# Patient Record
Sex: FEMALE | Race: OTHER | HISPANIC OR LATINO | Employment: STUDENT | URBAN - METROPOLITAN AREA
[De-identification: names, ages, dates, MRNs, and addresses within clinical notes are randomized per-mention and may not be internally consistent; named-entity substitution may affect disease eponyms.]

---

## 2017-10-11 ENCOUNTER — APPOINTMENT (OUTPATIENT)
Dept: URGENT CARE | Facility: MEDICAL CENTER | Age: 22
End: 2017-10-11
Payer: OTHER MISCELLANEOUS

## 2017-10-11 PROCEDURE — 99283 EMERGENCY DEPT VISIT LOW MDM: CPT

## 2017-10-11 PROCEDURE — G0382 LEV 3 HOSP TYPE B ED VISIT: HCPCS

## 2017-10-11 PROCEDURE — 12001 RPR S/N/AX/GEN/TRNK 2.5CM/<: CPT

## 2017-10-16 ENCOUNTER — APPOINTMENT (OUTPATIENT)
Dept: URGENT CARE | Facility: MEDICAL CENTER | Age: 22
End: 2017-10-16
Payer: OTHER MISCELLANEOUS

## 2017-10-16 PROCEDURE — 99213 OFFICE O/P EST LOW 20 MIN: CPT

## 2017-10-25 ENCOUNTER — APPOINTMENT (OUTPATIENT)
Dept: URGENT CARE | Facility: MEDICAL CENTER | Age: 22
End: 2017-10-25
Payer: OTHER MISCELLANEOUS

## 2017-10-25 PROCEDURE — 99213 OFFICE O/P EST LOW 20 MIN: CPT

## 2019-03-04 ENCOUNTER — APPOINTMENT (OUTPATIENT)
Dept: RADIOLOGY | Facility: MEDICAL CENTER | Age: 24
End: 2019-03-04
Payer: COMMERCIAL

## 2019-03-04 ENCOUNTER — OFFICE VISIT (OUTPATIENT)
Dept: URGENT CARE | Facility: MEDICAL CENTER | Age: 24
End: 2019-03-04
Payer: COMMERCIAL

## 2019-03-04 VITALS
HEART RATE: 81 BPM | HEIGHT: 64 IN | DIASTOLIC BLOOD PRESSURE: 73 MMHG | BODY MASS INDEX: 22.53 KG/M2 | RESPIRATION RATE: 16 BRPM | OXYGEN SATURATION: 100 % | WEIGHT: 132 LBS | TEMPERATURE: 97.6 F | SYSTOLIC BLOOD PRESSURE: 115 MMHG

## 2019-03-04 DIAGNOSIS — S93.402A SPRAIN OF LEFT ANKLE, UNSPECIFIED LIGAMENT, INITIAL ENCOUNTER: ICD-10-CM

## 2019-03-04 DIAGNOSIS — M25.572 ACUTE LEFT ANKLE PAIN: Primary | ICD-10-CM

## 2019-03-04 DIAGNOSIS — M25.572 ACUTE LEFT ANKLE PAIN: ICD-10-CM

## 2019-03-04 PROCEDURE — 73610 X-RAY EXAM OF ANKLE: CPT

## 2019-03-04 PROCEDURE — 99213 OFFICE O/P EST LOW 20 MIN: CPT | Performed by: FAMILY MEDICINE

## 2019-03-05 NOTE — PROGRESS NOTES
[facility]        NAME: Marylen Cheek is a 26 y o  female  : 1995    MRN: [id number]  DATE: 2019  TIME: 10:20 PM    Assessment and Plan   Acute left ankle pain [M25 572]  1  Acute left ankle pain  XR ankle 3+ vw left   2  Sprain of left ankle, unspecified ligament, initial encounter           Patient Instructions       Follow up with PCP in 3-5 days  Proceed to  ER if symptoms worsen  Chief Complaint     Chief Complaint   Patient presents with    Ankle Pain     Patient here with left ankle pain after a fall 1 week ago  History of Present Illness       Patient complaining of left ankle pain for the past week  Approximately 1 week ago she was going down 2 stairs when she twisted her ankle  Pain is predominantly on the top and outer aspect  Denies any swelling or bruising  She has been weight-bearing  However pain is gotten progressively worse  She has been taking some over-the-counter ibuprofen with minimal improvement  Denies previous left ankle injury in the past       Review of Systems   Review of Systems   Musculoskeletal: Positive for arthralgias  Current Medications     No current outpatient medications on file  Current Allergies     Allergies as of 2019    (No Known Allergies)            The following portions of the patient's history were reviewed and updated as appropriate: allergies, current medications, past family history, past medical history, past social history, past surgical history and problem list      No past medical history on file  No past surgical history on file  No family history on file  Medications have been verified          Objective   /73 (BP Location: Left arm, Patient Position: Sitting, Cuff Size: Standard)   Pulse 81   Temp 97 6 °F (36 4 °C) (Tympanic)   Resp 16   Ht 5' 4" (1 626 m)   Wt 59 9 kg (132 lb)   SpO2 100%   BMI 22 66 kg/m²        Physical Exam     Physical Exam   Musculoskeletal: She exhibits tenderness  Left lower extremity:  Full range on plantar flexion dorsiflexion  There is pain on inversion  No effusion appreciated over the lateral malleolus  No ecchymosis appreciated  Good tactile sensation capillary refill distal to the injury

## 2019-03-05 NOTE — PATIENT INSTRUCTIONS
X-ray of left ankle reveals no fracture subluxation  I wrap the patient's left foot with Ace wrap  Advised her to immersed her left foot in warm water for 15-20 minutes 2 to 3 times a day, keep left foot elevated  Continue with ibuprofen  Follow up with PCP symptoms persist or worsen  Esguince de tobillo   LO QUE NECESITA SABER:   Un esguince o torcedura de tobillo ocurre cuando 1 o más ligamentos en la articulación del tobillo se estiran o desgarran  Los ligamentos son tejidos resistentes que [employer]  Los ligamentos sostienen las articulaciones y [employer] huesos en govea lugar  INSTRUCCIONES SOBRE EL PREM HOSPITALARIA:   Regrese a la nathen de emergencias si:   · Usted tiene dolor intenso en el tobillo  · Govea pie o dedos del pie están fríos o entumecidos  · Govea tobillo se pone mas débil o inestable (tambaleante)  · Usted no puede apoyarse sobre el tobillo o pie  · Govea inflamación ha aumentado o regresado  Pregúntele a govea Glory Rinne vitaminas y minerales son adecuados para usted  · El dolor no desaparece, incluso después del [place]  · Usted tiene preguntas o inquietudes acerca de govea condición o cuidado  Medicamentos:  Es posible que usted necesite alguno de los siguientes:  · AINEs (Analgésicos antiinflamatorios no esteroides) jostin el ibuprofeno, ayudan a disminuir la inflamación, el dolor y la fiebre  Radha medicamento esta disponible con o sin serena receta médica  Los AINEs pueden causar sangrado estomacal o problemas renales en ciertas personas  Si usted franklyn un medicamento anticoagulante, siempre pregúntele a govea médico si los CASSIDY son seguros para usted  Siempre cornell la etiqueta de radha medicamento y [place] instrucciones  · El acetaminofén  [employer]  Está disponible sin receta médica  Pregunte la cantidad y la frecuencia con que debe tomarlos  [id number]  El acetaminofén puede causar daño en el hígado cuando no se franklyn de forma correcta      · Un medicamento con receta para el dolor  podrían ser Lendell Pitch  Pregunte cómo robby estos medicamentos de serena forma hansen  · Butte paris medicamentos jostin se le haya indicado  Consulte con govea médico si usted osmany que govea medicamento no le está ayudando o si presenta efectos secundarios  Infórmele si es alérgico a cualquier medicamento  Mantenga serena lista actualizada de los [employer], las vitaminas y los productos herbales que franklyn  Incluya los siguientes datos de los medicamentos: cantidad, frecuencia y motivo de administración  Traiga con usted la lista o los envases de la píldoras a paris citas de seguimiento  Lleve la lista de los medicamentos con usted en marge de serena emergencia  Cuidado personal:   · Use aparatos ortopédicos,  jostin serena [place], un yeso o serena férula podrían necesitarse para inmovilizar y proteger la articulación  Es posible que necesite usar muletas para disminuir el dolor cuando se desplace de un lado a otro  · Vaya a terapia física según indicaciones  Un fisioterapeuta le puede enseñar ejercicios para ayudarle a mejorar el movimiento y la fuerza, y para disminuir el dolor  · Descanse  govea tobillo para que pueda sanar  Regrese a paris actividades cotidianas según las indicaciones  · Aplique hielo sobre el tobillo de 15 a 20 minutos cada hora o jostin se le indique  Use un paquete de hielo o ponga hielo molido dentro de [employer]  Cúbrala con serena toalla  El hielo ayuda a evitar daño al tejido y a disminuir la inflamación y el dolor  · Compresión  govea tobillo  Pregunte si debería envolver un vendaje elástico alrededor del ligamento lesionado  La venda elástica le proporciona soporte y ayuda a disminuir la inflamación y el movimiento para que la articulación se recupere  Úselo por el tiempo indicado  · Eleve  govea tobillo por encima del nivel de govea corazón con la mayor frecuencia posible  [place] va a disminuir inflamación y el dolor   Apoye el tobillo sobre almohadas o cobijas para mantenerlo elevado cómodamente  Evite otra torcedura de tobillo:   · Permita que el tobillo se recupere  Infórmese del tiempo que el ligamento necesita par sanar  No tavon ninguna actividad física hasta que el médico lo autorice  Si usted [employer], podría desarrollar serena lesión mas grave  · Siempre tavon ejercicios de calentamiento y estiramiento  antes de hacer ejercicio o practicar un deporte  · Use equipo adecuado  Siempre use el calzado que le quede logan y esté hecho para la actividad que está practicando  También podría necesitar apoyo para los tobillos, almohadillas para los codos y rodillas o abrazaderas  Acuda a paris consultas de control con eddy médico según le indicaron  Anote paris preguntas para que se acuerde de hacerlas hellen paris visitas  © 2017 [facility] Information is for End User's use only and may not be sold, redistributed or otherwise used for commercial purposes  All illustrations and images included in CareNotes® are the copyrighted property of [employer] , Inc  or Parth Urbano  Esta información es sólo para uso en educación  Eddy intención no es darle un consejo médico sobre enfermedades o tratamientos  Colsulte con eddy Laruth Elliott farmacéutico antes de seguir cualquier régimen médico para saber si es seguro y efectivo para usted

## 2019-03-11 ENCOUNTER — OFFICE VISIT (OUTPATIENT)
Dept: FAMILY MEDICINE CLINIC | Facility: CLINIC | Age: 24
End: 2019-03-11
Payer: COMMERCIAL

## 2019-03-11 VITALS
OXYGEN SATURATION: 99 % | DIASTOLIC BLOOD PRESSURE: 70 MMHG | WEIGHT: 133 LBS | TEMPERATURE: 97.6 F | HEIGHT: 64 IN | BODY MASS INDEX: 22.71 KG/M2 | HEART RATE: 83 BPM | SYSTOLIC BLOOD PRESSURE: 110 MMHG | RESPIRATION RATE: 16 BRPM

## 2019-03-11 DIAGNOSIS — Z23 ENCOUNTER FOR VACCINATION: Primary | ICD-10-CM

## 2019-03-11 PROCEDURE — 3008F BODY MASS INDEX DOCD: CPT | Performed by: SPECIALIST

## 2019-03-11 PROCEDURE — 1036F TOBACCO NON-USER: CPT | Performed by: SPECIALIST

## 2019-03-11 PROCEDURE — 99213 OFFICE O/P EST LOW 20 MIN: CPT | Performed by: SPECIALIST

## 2019-03-11 NOTE — PROGRESS NOTES
Assessment/Plan:    Plan  Observe   For   2  Weeks   No point  Tenderness     Not  Red     Not   Swollen            Diagnoses and all orders for this visit:    Encounter for vaccination  -     Cancel: TDAP VACCINE GREATER THAN OR EQUAL TO 8YO IM  -     Cancel: influenza vaccine, [id number], quadrivalent, recombinant, PF, 0 5 mL, for patients 18 yr+ (FLUBLOK)          Subjective:     BMI Counseling: Body mass index is 22 83 kg/m²  Discussed the patient's BMI with her  The BMI is above average  BMI counseling and education was provided to the patient  Nutrition recommendations include decreasing overall calorie intake  Exercise recommendations include moderate aerobic physical activity for 150 minutes/week  Patient ID: Grupo Waterman is a 26 y o  female  24 Yo  Slipped  2   Weeks  Ago  On  Snow  Covered  Steps     Seen  walkin   No  Fracture      Taking  Motrin  Prn    Getting  Better     At  Worse   6  /  10   But   Improved         The following portions of the patient's history were reviewed and updated as appropriate: allergies, current medications, past family history, past medical history, past social history, past surgical history and problem list     Review of Systems   Constitutional: Negative for activity change, appetite change, chills, diaphoresis, fatigue and fever  HENT: Negative for congestion, sinus pressure, sinus pain and voice change  Eyes: Negative for visual disturbance  Respiratory: Negative for cough, chest tightness, shortness of breath and wheezing  Cardiovascular: Negative for chest pain, palpitations and leg swelling  Gastrointestinal: Negative for abdominal distention  Musculoskeletal: Negative for arthralgias, back pain, gait problem, joint swelling, myalgias, neck pain and neck stiffness  Skin: Negative            Objective:      /70 (BP Location: Left arm, Patient Position: Sitting, Cuff Size: Standard)   Pulse 83   Temp 97 6 °F (36 4 °C) (Tympanic) Resp 16   Ht 5' 4" (1 626 m)   Wt 60 3 kg (133 lb)   SpO2 99%   BMI 22 83 kg/m²          Physical Exam   Musculoskeletal: Normal range of motion  She exhibits no edema, tenderness or deformity     Left  Ankle   No  Swelling   No point  Tenderness     No  Redness       No  Pain  Flexion   Or  Everting

## 2019-03-11 NOTE — PATIENT INSTRUCTIONS
continue   To monitor  If  Worse   Need  To  See  Ortho    Stay  Off  Feet   As  Much  As  Possible       Ok  motrin

## 2020-07-07 ENCOUNTER — TELEPHONE (OUTPATIENT)
Dept: FAMILY MEDICINE CLINIC | Facility: CLINIC | Age: 25
End: 2020-07-07

## 2020-07-07 NOTE — TELEPHONE ENCOUNTER
----- Message from Malachy Nyhan, MA sent at 7/7/2020 10:37 AM EDT -----  This pt has been seen with-in the last 3 years, however has not seen with-in the past year  Please call pt to make well / follow-up visit  After 3 attempts send back to junior to send letter

## 2021-08-04 ENCOUNTER — OFFICE VISIT (OUTPATIENT)
Dept: URGENT CARE | Facility: MEDICAL CENTER | Age: 26
End: 2021-08-04
Payer: COMMERCIAL

## 2021-08-04 VITALS
OXYGEN SATURATION: 99 % | SYSTOLIC BLOOD PRESSURE: 138 MMHG | DIASTOLIC BLOOD PRESSURE: 91 MMHG | HEART RATE: 93 BPM | TEMPERATURE: 97.8 F | RESPIRATION RATE: 18 BRPM

## 2021-08-04 DIAGNOSIS — R19.7 DIARRHEA, UNSPECIFIED TYPE: ICD-10-CM

## 2021-08-04 DIAGNOSIS — Z20.822 ENCOUNTER FOR LABORATORY TESTING FOR COVID-19 VIRUS: ICD-10-CM

## 2021-08-04 DIAGNOSIS — R10.9 ABDOMINAL PAIN, UNSPECIFIED ABDOMINAL LOCATION: Primary | ICD-10-CM

## 2021-08-04 DIAGNOSIS — N39.0 URINARY TRACT INFECTION WITHOUT HEMATURIA, SITE UNSPECIFIED: ICD-10-CM

## 2021-08-04 LAB
SL AMB  POCT GLUCOSE, UA: NORMAL
SL AMB LEUKOCYTE ESTERASE,UA: NORMAL
SL AMB POCT BILIRUBIN,UA: NORMAL
SL AMB POCT BLOOD,UA: NORMAL
SL AMB POCT CLARITY,UA: CLEAR
SL AMB POCT COLOR,UA: YELLOW
SL AMB POCT KETONES,UA: NORMAL
SL AMB POCT NITRITE,UA: NORMAL
SL AMB POCT PH,UA: 1.01
SL AMB POCT SPECIFIC GRAVITY,UA: 1.01
SL AMB POCT URINE HCG: NORMAL
SL AMB POCT URINE PROTEIN: NORMAL
SL AMB POCT UROBILINOGEN: NORMAL

## 2021-08-04 PROCEDURE — 87086 URINE CULTURE/COLONY COUNT: CPT | Performed by: PHYSICIAN ASSISTANT

## 2021-08-04 PROCEDURE — 81025 URINE PREGNANCY TEST: CPT | Performed by: PHYSICIAN ASSISTANT

## 2021-08-04 PROCEDURE — U0005 INFEC AGEN DETEC AMPLI PROBE: HCPCS | Performed by: PHYSICIAN ASSISTANT

## 2021-08-04 PROCEDURE — 99213 OFFICE O/P EST LOW 20 MIN: CPT | Performed by: PHYSICIAN ASSISTANT

## 2021-08-04 PROCEDURE — U0003 INFECTIOUS AGENT DETECTION BY NUCLEIC ACID (DNA OR RNA); SEVERE ACUTE RESPIRATORY SYNDROME CORONAVIRUS 2 (SARS-COV-2) (CORONAVIRUS DISEASE [COVID-19]), AMPLIFIED PROBE TECHNIQUE, MAKING USE OF HIGH THROUGHPUT TECHNOLOGIES AS DESCRIBED BY CMS-2020-01-R: HCPCS | Performed by: PHYSICIAN ASSISTANT

## 2021-08-04 RX ORDER — CEPHALEXIN 500 MG/1
500 CAPSULE ORAL EVERY 8 HOURS SCHEDULED
Qty: 30 CAPSULE | Refills: 0 | Status: SHIPPED | OUTPATIENT
Start: 2021-08-04 | End: 2021-08-14

## 2021-08-04 RX ORDER — DESOGESTREL AND ETHINYL ESTRADIOL 0.15-0.03
1 KIT ORAL DAILY
COMMUNITY
Start: 2021-06-23 | End: 2022-06-23

## 2021-08-04 RX ORDER — ETONOGESTREL 68 MG/1
1 IMPLANT SUBCUTANEOUS
COMMUNITY

## 2021-08-04 NOTE — PROGRESS NOTES
[facility]        NAME: Jennifer Oliveira is a 26 y o  female  : 1995    MRN: [id number]  DATE: 2021  TIME: 12:24 PM    /91   Pulse 93   Temp 97 8 °F (36 6 °C)   Resp 18   SpO2 99%     Assessment and Plan   Abdominal pain, unspecified abdominal location [R10 9]  1  Abdominal pain, unspecified abdominal location  POCT urine dip auto non-scope    Urine culture    POCT urine HCG    cephalexin (KEFLEX) 500 mg capsule   2  Diarrhea, unspecified type  Novel Coronavirus (Covid-19),PCR [facility] - Office Collection    cephalexin (KEFLEX) 500 mg capsule   3  Urinary tract infection without hematuria, site unspecified  cephalexin (KEFLEX) 500 mg capsule   4  Encounter for laboratory testing for COVID-19 virus  cephalexin (KEFLEX) 500 mg capsule         Patient Instructions       Follow up with PCP in 3-5 days  Proceed to  ER if symptoms worsen  Chief Complaint     Chief Complaint   Patient presents with    Possible UTI     Patient c/o dysuria, Abdominal pain with cramping, diarrhea, and lower back pain x 1 week          History of Present Illness       Pt with 1 week of abdomen cramping with burning with urination, pt also with diarrhea,  Pt is not covid vaccinated       Review of Systems   Review of Systems   Constitutional: Negative  HENT: Negative  Eyes: Negative  Respiratory: Negative  Cardiovascular: Negative  Gastrointestinal: Negative  Endocrine: Negative  Genitourinary: Negative  Musculoskeletal: Negative  Skin: Negative  Allergic/Immunologic: Negative  Neurological: Negative  Hematological: Negative  Psychiatric/Behavioral: Negative  All other systems reviewed and are negative          Current Medications       Current Outpatient Medications:     desogestrel-ethinyl estradiol (APRI) 0 15-30 MG-MCG per tablet, Take 1 tablet by mouth daily, Disp: , Rfl:     cephalexin (KEFLEX) 500 mg capsule, Take 1 capsule (500 mg total) by mouth every 8 (eight) hours for 10 days, Disp: 30 capsule, Rfl: 0    etonogestrel (Nexplanon) subdermal implant, Inject 1 each under the skin, Disp: , Rfl:     Current Allergies     Allergies as of 08/04/2021    (No Known Allergies)            The following portions of the patient's history were reviewed and updated as appropriate: allergies, current medications, past family history, past medical history, past social history, past surgical history and problem list      History reviewed  No pertinent past medical history  History reviewed  No pertinent surgical history  Family History   Problem Relation Age of Onset    No Known Problems Mother     No Known Problems Father          Medications have been verified  Objective   /91   Pulse 93   Temp 97 8 °F (36 6 °C)   Resp 18   SpO2 99%        Physical Exam     Physical Exam  Vitals and nursing note reviewed  Constitutional:       Appearance: Normal appearance  She is normal weight  Comments: Pt will go to er if condition worsens     HENT:      Head: Normocephalic and atraumatic  Right Ear: Tympanic membrane, ear canal and external ear normal       Left Ear: Tympanic membrane, ear canal and external ear normal       Nose: Nose normal       Mouth/Throat:      Mouth: Mucous membranes are moist       Pharynx: Oropharynx is clear  Eyes:      Extraocular Movements: Extraocular movements intact  Conjunctiva/sclera: Conjunctivae normal       Pupils: Pupils are equal, round, and reactive to light  Cardiovascular:      Rate and Rhythm: Normal rate and regular rhythm  Pulses: Normal pulses  Heart sounds: Normal heart sounds  Pulmonary:      Effort: Pulmonary effort is normal       Breath sounds: Normal breath sounds  Abdominal:      General: Abdomen is flat  Bowel sounds are normal       Palpations: Abdomen is soft  Musculoskeletal:         General: Normal range of motion        Cervical back: Normal range of motion and neck supple  Skin:     General: Skin is warm  Capillary Refill: Capillary refill takes less than 2 seconds  Neurological:      General: No focal deficit present  Mental Status: She is alert and oriented to person, place, and time     Psychiatric:         Mood and Affect: Mood normal          Behavior: Behavior normal

## 2021-08-04 NOTE — PATIENT INSTRUCTIONS
[address]    Your healthcare provider and/or public health staff have evaluated you and have determined that you do not need to remain in the hospital at this time  At this time you can be isolated at home where you will be monitored by staff from your local or state health department  You should carefully follow the prevention and isolation steps below until a healthcare provider or local or state health department says that you can return to your normal activities  Stay home except to get medical care    People who are mildly ill with COVID-19 are able to isolate at home during their illness  You should restrict activities outside your home, except for getting medical care  Do not go to work, school, or public areas  Avoid using public transportation, ride-sharing, or taxis  Separate yourself from other people and animals in your home    People: As much as possible, you should stay in a specific room and away from other people in your home  Also, you should use a separate bathroom, if available  Animals: You should restrict contact with pets and other animals while you are sick with COVID-19, just like you would around other people  Although there have not been reports of pets or other animals becoming sick with COVID-19, it is still recommended that people sick with COVID-19 limit contact with animals until more information is known about the virus  When possible, have another member of your household care for your animals while you are sick  If you are sick with COVID-19, avoid contact with your pet, including petting, snuggling, being kissed or licked, and sharing food  If you must care for your pet or be around animals while you are sick, wash your hands before and after you interact with pets and wear a facemask  See COVID-19 and Animals for more information      Call ahead before visiting your doctor    If you have a medical appointment, call the healthcare provider and tell them that you have or may have COVID-19  This will help the healthcare providers office take steps to keep other people from getting infected or exposed  Wear a facemask    You should wear a facemask when you are around other people (e g , sharing a room or vehicle) or pets and before you enter a healthcare providers office  If you are not able to wear a facemask (for example, because it causes trouble breathing), then people who live with you should not stay in the same room with you, or they should wear a facemask if they enter your room  Cover your coughs and sneezes    Cover your mouth and nose with a tissue when you cough or sneeze  Throw used tissues in a lined trash can  Immediately wash your hands with soap and water for at least 20 seconds or, if soap and water are not available, clean your hands with an alcohol-based hand  that contains at least 60% alcohol  Clean your hands often    Wash your hands often with soap and water for at least 20 seconds, especially after blowing your nose, coughing, or sneezing; going to the bathroom; and before eating or preparing food  If soap and water are not readily available, use an alcohol-based hand  with at least 60% alcohol, covering all surfaces of your hands and rubbing them together until they feel dry  Soap and water are the best option if hands are visibly dirty  Avoid touching your eyes, nose, and mouth with unwashed hands  Avoid sharing personal household items    You should not share dishes, drinking glasses, cups, eating utensils, towels, or bedding with other people or pets in your home  After using these items, they should be washed thoroughly with soap and water  Clean all high-touch surfaces everyday    High touch surfaces include counters, tabletops, doorknobs, bathroom fixtures, toilets, phones, keyboards, tablets, and bedside tables  Also, clean any surfaces that may have blood, stool, or body fluids on them   Use a household cleaning spray or wipe, according to the label instructions  Labels contain instructions for safe and effective use of the cleaning product including precautions you should take when applying the product, such as wearing gloves and making sure you have good ventilation during use of the product  Monitor your symptoms    Seek prompt medical attention if your illness is worsening (e g , difficulty breathing)  Before seeking care, call your healthcare provider and tell them that you have, or are being evaluated for, COVID-19  Put on a facemask before you enter the facility  These steps will help the healthcare providers office to keep other people in the office or waiting room from getting infected or exposed  Ask your healthcare provider to call the local or [facility] health department  Persons who are placed under active monitoring or facilitated self-monitoring should follow instructions provided by their local health department or occupational health professionals, as appropriate  If you have a medical emergency and need to call 911, notify the dispatch personnel that you have, or are being evaluated for COVID-19  If possible, put on a facemask before emergency medical services arrive      Discontinuing home isolation    Patients with confirmed COVID-19 should remain under home isolation precautions until the following conditions are met:   - They have had no fever for at least 24 hours (that is one full day of no fever without the use medicine that reduces fevers)  AND  - other symptoms have improved (for example, when their cough or shortness of breath have improved)  AND  - If had mild or moderate illness, at least 10 days have passed since their symptoms first appeared or if severe illness (needed oxygen) or immunosuppressed, at least 20 days have passed since symptoms first appeared  Patients with confirmed COVID-19 should also notify close contacts (including their workplace) and ask that they self-quarantine  Currently, close contact is defined as being within 6 feet for 15 minutes or more from the period 24 hours starting 48 hours before symptom onset to the time at which the patient went into isolation  Close contacts of patients diagnosed with COVID-19 should be instructed by the patient to self-quarantine for 14 days from the last time of their last contact with the patient  Source: [email or web address]/ Pain   WHAT YOU NEED TO KNOW:   Abdominal pain can be dull, achy, or sharp  You may have pain in one area of your abdomen, or in your entire abdomen  Your pain may be caused by a condition such as constipation, food sensitivity or poisoning, infection, or a blockage  Abdominal pain can also be from a hernia, appendicitis, or an ulcer  Liver, gallbladder, or kidney conditions can also cause abdominal pain  The cause of your abdominal pain may be unknown  DISCHARGE INSTRUCTIONS:   Return to the emergency department if:   · You have new chest pain or shortness of breath  · You have pulsing pain in your upper abdomen or lower back that suddenly becomes constant  · Your pain is in the right lower abdominal area and worsens with movement  · You have a fever over 100 4°F (38°C) or shaking chills  · You are vomiting and cannot keep food or liquids down  · Your pain does not improve or gets worse over the next 8 to 12 hours  · You see blood in your vomit or bowel movements, or they look black and tarry  · Your skin or the whites of your eyes turn yellow  · You are a woman and have a large amount of vaginal bleeding that is not your monthly period  Contact your healthcare provider if:   · You have pain in your lower back  · You are a man and have pain in your testicles  · You have pain when you urinate  · You have questions or concerns about your condition or care      Follow up with your healthcare provider within 24 hours or as directed:  Write down your questions so you remember to ask them during your visits  Medicines:   · Medicines  may be given to calm your stomach and prevent vomiting or to decrease pain  Ask how to take pain medicine safely  · Take your medicine as directed  Contact your healthcare provider if you think your medicine is not helping or if you have side effects  Tell him of her if you are allergic to any medicine  Keep a list of the medicines, vitamins, and herbs you take  Include the amounts, and when and why you take them  Bring the list or the pill bottles to follow-up visits  Carry your medicine list with you in case of an emergency  © Copyright [employer] 2021 Information is for End User's use only and may not be sold, redistributed or otherwise used for commercial purposes  All illustrations and images included in CareNotes® are the copyrighted property of [employer]  or Carter Kimble   The above information is an  only  It is not intended as medical advice for individual conditions or treatments  Talk to your doctor, nurse or pharmacist before following any medical regimen to see if it is safe and effective for you  Urinary Tract Infection in Older Adults   WHAT YOU NEED TO KNOW:   A urinary tract infection (UTI) is caused by bacteria that get inside your urinary tract  Your urinary tract includes your kidneys, ureters, bladder, and urethra  Urine is made in your kidneys, and it flows from the ureters to the bladder  Urine leaves the bladder through the urethra  A UTI is more common in your lower urinary tract, which includes your bladder and urethra  DISCHARGE INSTRUCTIONS:   Return to the emergency department if:   · You are urinating very little or not at all  · You are vomiting  · You have a high fever with shaking chills  · You have side or back pain that gets worse  Call your doctor if:   · You have a fever      · You are a woman and you have increased white or yellow discharge from your vagina  · You do not feel better after 2 days of taking antibiotics  · You have questions or concerns about your condition or care  Medicines:   · Medicines  help treat the bacterial infection or decrease pain and burning when you urinate  You may also need medicines to decrease the urge to urinate often  If you have UTIs often (called recurrent UTIs), you may be given antibiotics to take regularly  You will be given directions for when and how to use antibiotics  The goal is to prevent UTIs but not cause antibiotic resistance by using antibiotics too often  · Take your medicine as directed  Contact your healthcare provider if you think your medicine is not helping or if you have side effects  Tell him or her if you are allergic to any medicine  Keep a list of the medicines, vitamins, and herbs you take  Include the amounts, and when and why you take them  Bring the list or the pill bottles to follow-up visits  Carry your medicine list with you in case of an emergency  Self-care:   · Drink liquids as directed  Liquids can help flush bacteria from your urinary tract  Ask how much liquid to drink each day and which liquids are best for you  You may need to drink more liquids than usual to help flush out the bacteria  Do not drink alcohol, caffeine, and citrus juices  These can irritate your bladder and increase your symptoms  · Apply heat  on your abdomen for 20 to 30 minutes every 2 hours for as many days as directed  Heat helps decrease discomfort and pressure in your bladder  Prevent a UTI:   · Urinate when you feel the urge  Do not hold your urine  Bacteria can grow if urine stays in the bladder too long  It may be helpful to urinate at least every 3 to 4 hours  · Urinate after you have sex  to flush away bacteria that can enter your urinary tract during sex  · Wear cotton underwear and clothes that are loose    Tight pants and nylon underwear can trap moisture and cause bacteria to grow  · Cranberry juice or cranberry supplements  may help prevent UTIs  Your healthcare provider can recommend the right juice or supplement for you  · Women should wipe front to back  after urinating or having a bowel movement  This may prevent germs from getting into the urinary tract  Do not douche or use feminine deodorants  These can change the chemical balance in your vagina  You may also be given vaginal estrogen medicine  This medicine helps prevent recurrent UTIs in women who have gone through menopause or are in brandy-menopause  Follow up with your healthcare provider as directed:  Write down your questions so you remember to ask them during your visits  © Copyright [employer] 2021 Information is for End User's use only and may not be sold, redistributed or otherwise used for commercial purposes  All illustrations and images included in CareNotes® are the copyrighted property of [employer]  or Carter Kimble   The above information is an  only  It is not intended as medical advice for individual conditions or treatments  Talk to your doctor, nurse or pharmacist before following any medical regimen to see if it is safe and effective for you

## 2021-08-05 LAB
BACTERIA UR CULT: NORMAL
SARS-COV-2 RNA RESP QL NAA+PROBE: NEGATIVE

## 2021-09-13 ENCOUNTER — OFFICE VISIT (OUTPATIENT)
Dept: URGENT CARE | Facility: MEDICAL CENTER | Age: 26
End: 2021-09-13
Payer: COMMERCIAL

## 2021-09-13 VITALS
SYSTOLIC BLOOD PRESSURE: 124 MMHG | WEIGHT: 130 LBS | HEART RATE: 82 BPM | DIASTOLIC BLOOD PRESSURE: 80 MMHG | RESPIRATION RATE: 18 BRPM | OXYGEN SATURATION: 100 % | HEIGHT: 64 IN | BODY MASS INDEX: 22.2 KG/M2 | TEMPERATURE: 97.3 F

## 2021-09-13 DIAGNOSIS — L03.90 CELLULITIS, UNSPECIFIED CELLULITIS SITE: Primary | ICD-10-CM

## 2021-09-13 PROCEDURE — 99213 OFFICE O/P EST LOW 20 MIN: CPT | Performed by: PHYSICIAN ASSISTANT

## 2021-09-13 RX ORDER — SULFAMETHOXAZOLE AND TRIMETHOPRIM 800; 160 MG/1; MG/1
1 TABLET ORAL EVERY 12 HOURS SCHEDULED
Qty: 14 TABLET | Refills: 0 | Status: SHIPPED | OUTPATIENT
Start: 2021-09-13 | End: 2021-09-20

## 2021-09-13 RX ORDER — CEPHALEXIN 500 MG/1
500 CAPSULE ORAL EVERY 8 HOURS SCHEDULED
Qty: 30 CAPSULE | Refills: 0 | Status: SHIPPED | OUTPATIENT
Start: 2021-09-13 | End: 2021-09-23

## 2021-09-14 NOTE — PROGRESS NOTES
[facility]        NAME: Cuca Latham is a 26 y o  female  : 1995    MRN: [id number]  DATE: 2021  TIME: 8:07 PM    /80   Pulse 82   Temp (!) 97 3 °F (36 3 °C)   Resp 18   Ht 5' 4" (1 626 m)   Wt 59 kg (130 lb)   LMP 2021   SpO2 100%   BMI 22 31 kg/m²     Assessment and Plan   Cellulitis, unspecified cellulitis site [L03 90]  1  Cellulitis, unspecified cellulitis site  sulfamethoxazole-trimethoprim (BACTRIM DS) 800-160 mg per tablet    cephalexin (KEFLEX) 500 mg capsule         Patient Instructions       Follow up with PCP in 3-5 days  Proceed to  ER if symptoms worsen  Chief Complaint     Chief Complaint   Patient presents with    Abscess     Patient has a bump in her R axilla that started Saturday  She states it is getting bigger  She noticed another bump in her L axilla  Patient states she shaved Sat morning and it started after that         History of Present Illness       Pt with bilat axilla lumps after shaving  Mild tenderness x 2 days       Review of Systems   Review of Systems   Constitutional: Negative  HENT: Negative  Eyes: Negative  Respiratory: Negative  Cardiovascular: Negative  Gastrointestinal: Negative  Endocrine: Negative  Genitourinary: Negative  Musculoskeletal: Negative  Skin: Negative  Allergic/Immunologic: Negative  Neurological: Negative  Hematological: Negative  Psychiatric/Behavioral: Negative  All other systems reviewed and are negative          Current Medications       Current Outpatient Medications:     etonogestrel (Nexplanon) subdermal implant, Inject 1 each under the skin, Disp: , Rfl:     cephalexin (KEFLEX) 500 mg capsule, Take 1 capsule (500 mg total) by mouth every 8 (eight) hours for 10 days, Disp: 30 capsule, Rfl: 0    desogestrel-ethinyl estradiol (APRI) 0 15-30 MG-MCG per tablet, Take 1 tablet by mouth daily (Patient not taking: Reported on 2021), Disp: , Rfl:   sulfamethoxazole-trimethoprim (BACTRIM DS) 800-160 mg per tablet, Take 1 tablet by mouth every 12 (twelve) hours for 7 days, Disp: 14 tablet, Rfl: 0    Current Allergies     Allergies as of 09/13/2021    (No Known Allergies)            The following portions of the patient's history were reviewed and updated as appropriate: allergies, current medications, past family history, past medical history, past social history, past surgical history and problem list      History reviewed  No pertinent past medical history  History reviewed  No pertinent surgical history  Family History   Problem Relation Age of Onset    No Known Problems Mother     No Known Problems Father          Medications have been verified  Objective   /80   Pulse 82   Temp (!) 97 3 °F (36 3 °C)   Resp 18   Ht 5' 4" (1 626 m)   Wt 59 kg (130 lb)   LMP 08/13/2021   SpO2 100%   BMI 22 31 kg/m²        Physical Exam     Physical Exam  Vitals and nursing note reviewed  Constitutional:       Appearance: Normal appearance  She is normal weight  HENT:      Head: Normocephalic and atraumatic  Right Ear: Tympanic membrane, ear canal and external ear normal       Left Ear: Tympanic membrane, ear canal and external ear normal       Nose: Nose normal       Mouth/Throat:      Mouth: Mucous membranes are moist       Pharynx: Oropharynx is clear  Eyes:      Extraocular Movements: Extraocular movements intact  Conjunctiva/sclera: Conjunctivae normal       Pupils: Pupils are equal, round, and reactive to light  Cardiovascular:      Rate and Rhythm: Normal rate and regular rhythm  Pulses: Normal pulses  Heart sounds: Normal heart sounds  Pulmonary:      Effort: Pulmonary effort is normal       Breath sounds: Normal breath sounds  Abdominal:      General: Abdomen is flat  Bowel sounds are normal       Palpations: Abdomen is soft  Musculoskeletal:         General: Normal range of motion        Cervical back: Normal range of motion and neck supple  Skin:     Capillary Refill: Capillary refill takes less than 2 seconds  Comments: bilat axilla pea size erythema and swelling not fluctuant    Neurological:      General: No focal deficit present  Mental Status: She is alert and oriented to person, place, and time     Psychiatric:         Mood and Affect: Mood normal          Behavior: Behavior normal

## 2021-09-14 NOTE — PATIENT INSTRUCTIONS
Cellulitis   WHAT YOU NEED TO KNOW:   Cellulitis is a skin infection caused by bacteria  Cellulitis is common and can become severe  Cellulitis usually appears on the lower legs  It can also appear on the arms, face, and other areas  Cellulitis develops when bacteria enter a crack or break in your skin, such as a scratch, bite, or cut  DISCHARGE INSTRUCTIONS:   Return to the emergency department if:   · Your wound gets larger and more painful  · You feel a crackling under your skin when you touch it  · You have purple dots or bumps on your skin, or you see bleeding under your skin  · You see red streaks coming from the infected area  Call your doctor if:   · The red, warm, swollen area gets larger  · Your fever or pain does not go away or gets worse  · The area does not get smaller after 3 days of antibiotics  · You have questions or concerns about your condition or care  Medicines: You should start to see improvement in 3 days  If cellulitis is not treated, the infection can spread through your body and become life-threatening  You may need any of the following medicines:  · Antibiotics  help treat the bacterial infection  · Acetaminophen  decreases pain and fever  It is available without a doctor's order  Ask how much to take and how often to take it  Follow directions  Read the labels of all other medicines you are using to see if they also contain acetaminophen, or ask your doctor or pharmacist  Acetaminophen can cause liver damage if not taken correctly  Do not use more than 4 grams (4,000 milligrams) total of acetaminophen in one day  · NSAIDs , such as ibuprofen, help decrease swelling, pain, and fever  This medicine is available with or without a doctor's order  NSAIDs can cause stomach bleeding or kidney problems in certain people  If you take blood thinner medicine, always ask your healthcare provider if NSAIDs are safe for you   Always read the medicine label and follow directions  · Take your medicine as directed  Contact your healthcare provider if you think your medicine is not helping or if you have side effects  Tell him or her if you are allergic to any medicine  Keep a list of the medicines, vitamins, and herbs you take  Include the amounts, and when and why you take them  Bring the list or the pill bottles to follow-up visits  Carry your medicine list with you in case of an emergency  Self-care:   · Wash the area with soap and water every day  Gently pat dry  Use bandages if directed by your healthcare provider  · Elevate the area above the level of your heart  as often as you can  This will help decrease swelling and pain  Prop the area on pillows or blankets to keep it elevated comfortably  · Place a cool, damp cloth on the area  Use clean cloths and clean water  You can do this as often as you need to  Cool, damp cloths may help decrease pain  · Apply cream or ointment as directed  These help protect the area  Most over-the-counter products, such as petroleum jelly, are good to use  Ask your healthcare provider about specific creams or ointments you should use  Prevent cellulitis:   · Do not scratch bug bites or areas of injury  You increase your risk for cellulitis by scratching these areas  · Do not share personal items, such as towels, clothing, and razors  · Clean exercise equipment  with germ-killing  before and after you use it  · Treat athlete's foot  This can help prevent the spread of a bacterial skin infection  · Wash your hands often  Use soap and water  Wash your hands after you use the bathroom, change a child's diapers, or sneeze  Wash your hands before you prepare or eat food  Use lotion to prevent dry, cracked skin  Follow up with your doctor within 3 days, or as directed:  He or she will check if your cellulitis is getting better   Write down your questions so you remember to ask them during your visits  © Copyright [employer] 2021 Information is for End User's use only and may not be sold, redistributed or otherwise used for commercial purposes  All illustrations and images included in CareNotes® are the copyrighted property of [employer]  or [employer] Styles  The above information is an  only  It is not intended as medical advice for individual conditions or treatments  Talk to your doctor, nurse or pharmacist before following any medical regimen to see if it is safe and effective for you